# Patient Record
Sex: MALE | Race: BLACK OR AFRICAN AMERICAN | NOT HISPANIC OR LATINO | Employment: FULL TIME | URBAN - METROPOLITAN AREA
[De-identification: names, ages, dates, MRNs, and addresses within clinical notes are randomized per-mention and may not be internally consistent; named-entity substitution may affect disease eponyms.]

---

## 2023-01-24 PROBLEM — S43.431A LABRAL TEAR OF SHOULDER, RIGHT, INITIAL ENCOUNTER: Status: ACTIVE | Noted: 2023-01-24

## 2023-01-24 PROBLEM — M25.511 SHOULDER PAIN, RIGHT: Status: ACTIVE | Noted: 2023-01-24

## 2023-01-24 PROBLEM — M25.611 STIFFNESS OF RIGHT SHOULDER JOINT: Status: ACTIVE | Noted: 2023-01-24

## 2023-01-24 PROBLEM — M54.30 SCIATICA: Status: ACTIVE | Noted: 2023-01-24

## 2023-01-24 PROBLEM — R29.898 SHOULDER WEAKNESS: Status: ACTIVE | Noted: 2023-01-24

## 2023-01-24 RX ORDER — CYCLOBENZAPRINE HCL 10 MG
1 TABLET ORAL EVERY 8 HOURS PRN
COMMUNITY
Start: 2022-10-31 | End: 2023-03-09

## 2023-02-11 RX ORDER — OMEGA-3-ACID ETHYL ESTERS 1 G/1
2 CAPSULE, LIQUID FILLED ORAL 2 TIMES DAILY
COMMUNITY
Start: 2022-03-25 | End: 2023-03-09 | Stop reason: SDUPTHER

## 2023-03-09 ENCOUNTER — OFFICE VISIT (OUTPATIENT)
Dept: PRIMARY CARE | Facility: CLINIC | Age: 33
End: 2023-03-09
Payer: OTHER GOVERNMENT

## 2023-03-09 VITALS
WEIGHT: 237 LBS | HEIGHT: 76 IN | SYSTOLIC BLOOD PRESSURE: 106 MMHG | OXYGEN SATURATION: 98 % | HEART RATE: 69 BPM | DIASTOLIC BLOOD PRESSURE: 76 MMHG | BODY MASS INDEX: 28.86 KG/M2 | TEMPERATURE: 98 F

## 2023-03-09 DIAGNOSIS — E78.1 HYPERTRIGLYCERIDEMIA: Primary | ICD-10-CM

## 2023-03-09 PROBLEM — M54.30 SCIATICA: Status: RESOLVED | Noted: 2023-01-24 | Resolved: 2023-03-09

## 2023-03-09 PROBLEM — M25.511 SHOULDER PAIN, RIGHT: Status: RESOLVED | Noted: 2023-01-24 | Resolved: 2023-03-09

## 2023-03-09 PROBLEM — S43.431A LABRAL TEAR OF SHOULDER, RIGHT, INITIAL ENCOUNTER: Status: RESOLVED | Noted: 2023-01-24 | Resolved: 2023-03-09

## 2023-03-09 PROBLEM — R29.898 SHOULDER WEAKNESS: Status: RESOLVED | Noted: 2023-01-24 | Resolved: 2023-03-09

## 2023-03-09 PROBLEM — M25.611 STIFFNESS OF RIGHT SHOULDER JOINT: Status: RESOLVED | Noted: 2023-01-24 | Resolved: 2023-03-09

## 2023-03-09 PROCEDURE — 1036F TOBACCO NON-USER: CPT | Performed by: FAMILY MEDICINE

## 2023-03-09 PROCEDURE — 99214 OFFICE O/P EST MOD 30 MIN: CPT | Performed by: FAMILY MEDICINE

## 2023-03-09 RX ORDER — OMEGA-3-ACID ETHYL ESTERS 1 G/1
1 CAPSULE, LIQUID FILLED ORAL 2 TIMES DAILY
Qty: 180 CAPSULE | Refills: 3 | Status: SHIPPED | OUTPATIENT
Start: 2023-03-09 | End: 2023-09-19 | Stop reason: SDUPTHER

## 2023-03-09 ASSESSMENT — PATIENT HEALTH QUESTIONNAIRE - PHQ9
SUM OF ALL RESPONSES TO PHQ9 QUESTIONS 1 AND 2: 0
2. FEELING DOWN, DEPRESSED OR HOPELESS: NOT AT ALL
1. LITTLE INTEREST OR PLEASURE IN DOING THINGS: NOT AT ALL

## 2023-03-09 NOTE — PROGRESS NOTES
ASSESSMENT/PLAN:      Problem List Items Addressed This Visit          Other    Hypertriglyceridemia - Primary    Relevant Medications    omega-3 acid ethyl esters (Lovaza) 1 gram capsule    Other Relevant Orders    Lipid panel       Patient Instructions:  Patient Instructions   Glad to see that everything is going well.  Continue Lovaza.  We will recheck labs.  Follow-up in 6 months      Signed by: Yisel Penny DO       FUTURE DIRECTION:   Need to do CPE at next appointment    Subjective   SUBJECTIVE:     HPI : Patient is a 33 y.o. male who presents today for the following:     Elevated Cholesterol and Triglycerides: Was treated with simvastatin but was advised to discontinue and try Lovaza      Chest Pain: improved  Has strong family history of CAD, father passed at 49 years old from MI    Anxiety: has a lot of anxiety at work trying to meet expectations, but ia able to cope     Review of Systems   All other systems reviewed and are negative.      Past Medical History:   Diagnosis Date    Hypertriglyceridemia 03/09/2023        No past surgical history on file.     Current Outpatient Medications   Medication Instructions    omega-3 acid ethyl esters (LOVAZA) 1 g, oral, 2 times daily, For high triglycerides        Allergies   Allergen Reactions    Penicillins Hives and Swelling        Social History     Socioeconomic History    Marital status:      Spouse name: Not on file    Number of children: Not on file    Years of education: Not on file    Highest education level: Not on file   Occupational History    Not on file   Tobacco Use    Smoking status: Never    Smokeless tobacco: Never   Vaping Use    Vaping status: Not on file   Substance and Sexual Activity    Alcohol use: Not on file    Drug use: Not on file    Sexual activity: Not on file   Other Topics Concern    Not on file   Social History Narrative    Not on file     Social Determinants of Health     Financial Resource Strain: Not on file  "  Food Insecurity: Not on file   Transportation Needs: Not on file   Physical Activity: Not on file   Stress: Not on file   Social Connections: Not on file   Intimate Partner Violence: Not on file   Housing Stability: Not on file        Family History   Problem Relation Name Age of Onset    Hypertension Mother      Heart disease Father      Multiple sclerosis Sister          Objective   OBJECTIVE:     Vitals:    03/09/23 0735   BP: 106/76   Pulse: 69   Temp: 36.7 °C (98 °F)   TempSrc: Temporal   SpO2: 98%   Weight: 108 kg (237 lb)   Height: 1.918 m (6' 3.5\")        Physical Exam  HENT:      Head: Normocephalic and atraumatic.      Nose: Nose normal.      Mouth/Throat:      Mouth: Mucous membranes are moist.   Eyes:      Pupils: Pupils are equal, round, and reactive to light.   Cardiovascular:      Rate and Rhythm: Normal rate and regular rhythm.      Pulses: Normal pulses.      Heart sounds: No murmur heard.  Pulmonary:      Effort: Pulmonary effort is normal.      Breath sounds: Normal breath sounds.   Abdominal:      Tenderness: There is no abdominal tenderness.   Musculoskeletal:         General: Normal range of motion.      Cervical back: Normal range of motion.   Skin:     General: Skin is warm and dry.   Neurological:      Mental Status: He is alert.   Psychiatric:         Mood and Affect: Mood normal.           "

## 2023-03-09 NOTE — PATIENT INSTRUCTIONS
Glad to see that everything is going well.  Continue Lovaza.  We will recheck labs.  Follow-up in 6 months

## 2023-03-10 ENCOUNTER — TELEPHONE (OUTPATIENT)
Dept: PRIMARY CARE | Facility: CLINIC | Age: 33
End: 2023-03-10
Payer: OTHER GOVERNMENT

## 2023-09-18 ASSESSMENT — PROMIS GLOBAL HEALTH SCALE
RATE_SOCIAL_SATISFACTION: VERY GOOD
RATE_AVERAGE_FATIGUE: MODERATE
CARRYOUT_SOCIAL_ACTIVITIES: VERY GOOD
CARRYOUT_PHYSICAL_ACTIVITIES: MODERATELY
RATE_QUALITY_OF_LIFE: GOOD
RATE_PHYSICAL_HEALTH: GOOD
RATE_AVERAGE_PAIN: 7
EMOTIONAL_PROBLEMS: SOMETIMES
RATE_MENTAL_HEALTH: VERY GOOD
RATE_GENERAL_HEALTH: GOOD

## 2023-09-19 ENCOUNTER — OFFICE VISIT (OUTPATIENT)
Dept: PRIMARY CARE | Facility: CLINIC | Age: 33
End: 2023-09-19
Payer: OTHER GOVERNMENT

## 2023-09-19 VITALS
WEIGHT: 237 LBS | BODY MASS INDEX: 29.23 KG/M2 | SYSTOLIC BLOOD PRESSURE: 86 MMHG | HEART RATE: 69 BPM | OXYGEN SATURATION: 98 % | DIASTOLIC BLOOD PRESSURE: 64 MMHG | TEMPERATURE: 97.8 F

## 2023-09-19 DIAGNOSIS — M54.42 ACUTE LEFT-SIDED LOW BACK PAIN WITH LEFT-SIDED SCIATICA: ICD-10-CM

## 2023-09-19 DIAGNOSIS — E78.1 HYPERTRIGLYCERIDEMIA: Primary | ICD-10-CM

## 2023-09-19 PROCEDURE — 99395 PREV VISIT EST AGE 18-39: CPT | Performed by: FAMILY MEDICINE

## 2023-09-19 PROCEDURE — 1036F TOBACCO NON-USER: CPT | Performed by: FAMILY MEDICINE

## 2023-09-19 RX ORDER — NAPROXEN 500 MG/1
500 TABLET ORAL 2 TIMES DAILY PRN
Qty: 42 TABLET | Refills: 0 | Status: SHIPPED | OUTPATIENT
Start: 2023-09-19 | End: 2023-10-10

## 2023-09-19 RX ORDER — OMEGA-3-ACID ETHYL ESTERS 1 G/1
1 CAPSULE, LIQUID FILLED ORAL 2 TIMES DAILY
Qty: 180 CAPSULE | Refills: 3 | Status: SHIPPED | OUTPATIENT
Start: 2023-09-19 | End: 2024-09-18

## 2023-09-19 NOTE — PROGRESS NOTES
ASSESSMENT/PLAN:      Problem List Items Addressed This Visit       Hypertriglyceridemia - Primary    Relevant Medications    omega-3 acid ethyl esters (Lovaza) 1 gram capsule     Other Visit Diagnoses       Acute left-sided low back pain with left-sided sciatica        Relevant Medications    naproxen (Naprosyn) 500 mg tablet    Other Relevant Orders    Referral to Physical Therapy          Patient Instructions:  Patient Instructions   Hypertriglycerides: Make sure to get labs done  Acute low back pain: Try naproxen twice a day for 2 to 3 weeks, referral for physical therapy      Signed by: Yisel Penny DO       FUTURE DIRECTION:   Can call for xray if no improvement     Subjective   SUBJECTIVE:     HPI : Patient is a 33 y.o. male who presents today for the following:     Elevated Cholesterol and Triglycerides:   - Was treated with simvastatin but was advised to discontinue and try Lovaza    Left low back pain  - pain radiates to left glutes and around hip   - worse with running and cardio   - has tried ibuprofen and tylenol       Anxiety: has a lot of anxiety at work trying to meet expectations, but is able to cope     Review of Systems   All other systems reviewed and are negative.      Past Medical History:   Diagnosis Date    Hypertriglyceridemia 03/09/2023        History reviewed. No pertinent surgical history.     Current Outpatient Medications   Medication Instructions    naproxen (NAPROSYN) 500 mg, oral, 2 times daily PRN    omega-3 acid ethyl esters (LOVAZA) 1 g, oral, 2 times daily, For high triglycerides        Allergies   Allergen Reactions    Penicillins Hives and Swelling        Social History     Socioeconomic History    Marital status:      Spouse name: Not on file    Number of children: Not on file    Years of education: Not on file    Highest education level: Not on file   Occupational History    Not on file   Tobacco Use    Smoking status: Never    Smokeless tobacco: Never    Substance and Sexual Activity    Alcohol use: Not on file    Drug use: Not on file    Sexual activity: Not on file   Other Topics Concern    Not on file   Social History Narrative    Not on file     Social Determinants of Health     Financial Resource Strain: Not on file   Food Insecurity: Not on file   Transportation Needs: Not on file   Physical Activity: Not on file   Stress: Not on file   Social Connections: Not on file   Intimate Partner Violence: Not on file   Housing Stability: Not on file        Family History   Problem Relation Name Age of Onset    Hypertension Mother      Heart disease Father      Multiple sclerosis Sister          Objective   OBJECTIVE:     Vitals:    09/19/23 0821   BP: 86/64   Pulse: 69   Temp: 36.6 °C (97.8 °F)   SpO2: 98%   Weight: 108 kg (237 lb)        Physical Exam  HENT:      Head: Normocephalic and atraumatic.      Nose: Nose normal.      Mouth/Throat:      Mouth: Mucous membranes are moist.   Eyes:      Pupils: Pupils are equal, round, and reactive to light.   Cardiovascular:      Rate and Rhythm: Normal rate and regular rhythm.      Pulses: Normal pulses.      Heart sounds: No murmur heard.  Pulmonary:      Effort: Pulmonary effort is normal.      Breath sounds: Normal breath sounds.   Abdominal:      Tenderness: There is no abdominal tenderness.   Musculoskeletal:         General: Normal range of motion.      Cervical back: Normal range of motion.      Comments: +left FADIR   Skin:     General: Skin is warm and dry.   Neurological:      Mental Status: He is alert.   Psychiatric:         Mood and Affect: Mood normal.

## 2023-09-19 NOTE — PATIENT INSTRUCTIONS
Hypertriglycerides: Make sure to get labs done  Acute low back pain: Try naproxen twice a day for 2 to 3 weeks, referral for physical therapy

## 2023-09-27 DIAGNOSIS — Z23 FLU VACCINE NEED: ICD-10-CM

## 2023-09-29 ENCOUNTER — CLINICAL SUPPORT (OUTPATIENT)
Dept: PRIMARY CARE | Facility: CLINIC | Age: 33
End: 2023-09-29
Payer: OTHER GOVERNMENT

## 2023-09-29 DIAGNOSIS — Z23 FLU VACCINE NEED: ICD-10-CM

## 2023-09-29 PROCEDURE — 90686 IIV4 VACC NO PRSV 0.5 ML IM: CPT | Performed by: FAMILY MEDICINE

## 2023-09-29 PROCEDURE — 90471 IMMUNIZATION ADMIN: CPT | Performed by: FAMILY MEDICINE

## 2023-11-21 ENCOUNTER — LAB (OUTPATIENT)
Dept: LAB | Facility: LAB | Age: 33
End: 2023-11-21
Payer: OTHER GOVERNMENT

## 2023-11-21 ENCOUNTER — TELEPHONE (OUTPATIENT)
Dept: PRIMARY CARE | Facility: CLINIC | Age: 33
End: 2023-11-21

## 2023-11-21 DIAGNOSIS — E78.1 HYPERTRIGLYCERIDEMIA: ICD-10-CM

## 2023-11-21 LAB
CHOLEST SERPL-MCNC: 152 MG/DL (ref 0–199)
CHOLESTEROL/HDL RATIO: 4.9
HDLC SERPL-MCNC: 31.3 MG/DL
LDLC SERPL CALC-MCNC: 62 MG/DL
NON HDL CHOLESTEROL: 121 MG/DL (ref 0–149)
TRIGL SERPL-MCNC: 294 MG/DL (ref 0–149)
VLDL: 59 MG/DL (ref 0–40)

## 2023-11-21 PROCEDURE — 80061 LIPID PANEL: CPT

## 2023-11-21 PROCEDURE — 36415 COLL VENOUS BLD VENIPUNCTURE: CPT

## 2023-11-21 NOTE — TELEPHONE ENCOUNTER
----- Message from Yisel Penny DO sent at 11/21/2023  1:27 PM EST -----  Please let patient know the following:  HDL still less than 40, this usually improves with exercise recommended 30 minutes of aerobic exercise daily  Triglycerides and VLDL  improved

## 2024-01-20 ENCOUNTER — HOSPITAL ENCOUNTER (OUTPATIENT)
Dept: RADIOLOGY | Facility: EXTERNAL LOCATION | Age: 34
Discharge: HOME | End: 2024-01-20

## 2024-01-20 DIAGNOSIS — M54.9 UPPER BACK PAIN: ICD-10-CM

## 2024-09-19 ENCOUNTER — APPOINTMENT (OUTPATIENT)
Dept: PRIMARY CARE | Facility: CLINIC | Age: 34
End: 2024-09-19
Payer: OTHER GOVERNMENT

## 2025-01-04 ENCOUNTER — OFFICE VISIT (OUTPATIENT)
Dept: URGENT CARE | Age: 35
End: 2025-01-04
Payer: OTHER GOVERNMENT

## 2025-01-04 VITALS
SYSTOLIC BLOOD PRESSURE: 150 MMHG | DIASTOLIC BLOOD PRESSURE: 72 MMHG | HEART RATE: 99 BPM | RESPIRATION RATE: 20 BRPM | OXYGEN SATURATION: 96 % | TEMPERATURE: 98.1 F

## 2025-01-04 DIAGNOSIS — J10.1 INFLUENZA A: Primary | ICD-10-CM

## 2025-01-04 DIAGNOSIS — R05.1 ACUTE COUGH: ICD-10-CM

## 2025-01-04 LAB
POC RAPID INFLUENZA A: POSITIVE
POC RAPID INFLUENZA B: NEGATIVE
POC SARS-COV-2 AG BINAX: NORMAL

## 2025-01-04 RX ORDER — OSELTAMIVIR PHOSPHATE 75 MG/1
75 CAPSULE ORAL EVERY 12 HOURS
Qty: 10 CAPSULE | Refills: 0 | Status: SHIPPED | OUTPATIENT
Start: 2025-01-04 | End: 2025-01-09

## 2025-01-04 ASSESSMENT — ENCOUNTER SYMPTOMS: COUGH: 1

## 2025-01-04 NOTE — LETTER
January 4, 2025     Patient: Christopher Pepe   YOB: 1990   Date of Visit: 1/4/2025       To Whom It May Concern:    Christopher Pepe was seen in my clinic on 1/4/2025 at 9:45 am. Please excuse Christopher for his absence from work on this day to make the appointment.  Patient tested positive for influenza A.     If you have any questions or concerns, please don't hesitate to call.         Sincerely,         Susannah Bennett PA-C

## 2025-01-04 NOTE — PROGRESS NOTES
Subjective   Patient ID: Christopher Pepe is a 34 y.o. male. They present today with a chief complaint of Cough and Nasal Congestion.    History of Present Illness  Patient presents for illness symptoms that started a few days ago.  Patient admits to nasal congestion, runny nose, postnasal drainage, cough. He states he has scratchy throat and ear pressure. He denies fevers, chills, SOB, chest tightness, wheezing. He had childhood asthma, though hasn't needed albuterol in many years.        Cough        Past Medical History  Allergies as of 01/04/2025 - Reviewed 01/04/2025   Allergen Reaction Noted    Penicillins Hives and Swelling 01/24/2023       (Not in a hospital admission)       Past Medical History:   Diagnosis Date    Hypertriglyceridemia 03/09/2023       No past surgical history on file.     reports that he has never smoked. He has never used smokeless tobacco.    Review of Systems  Review of Systems   Respiratory:  Positive for cough.                                   Objective    Vitals:    01/04/25 0955   BP: 150/72   Pulse: 99   Resp: 20   Temp: 36.7 °C (98.1 °F)   SpO2: 96%     No LMP for male patient.    Physical Exam    Procedures    Point of Care Test & Imaging Results from this visit  Results for orders placed or performed in visit on 01/04/25   POCT Covid-19 Rapid Antigen   Result Value Ref Range    POC BEAU-COV-2 AG  Presumptive negative test for SARS-CoV-2 (no antigen detected)     Presumptive negative test for SARS-CoV-2 (no antigen detected)   POCT Influenza A/B manually resulted   Result Value Ref Range    POC Rapid Influenza A Positive (A) Negative    POC Rapid Influenza B Negative Negative      No results found.    Diagnostic study results (if any) were reviewed by Susannah Bennett PA-C.    Assessment/Plan   Allergies, medications, history, and pertinent labs/EKGs/Imaging reviewed by Susannah Bennett PA-C.     Medical Decision Making  MDM: History, examination and testing are consistent with  influenza.  There are no signs of pneumonia, sinusitis, otitis or other bacterial etiology. Rx for Tamiflu.  Plan at this time is supportive measures and symptomatic care at home. Advised to go to ER if worsens, otherwise follow with pcp. Patient verbalized understanding and agrees with plan.      Orders and Diagnoses  Diagnoses and all orders for this visit:  Influenza A  -     oseltamivir (Tamiflu) 75 mg capsule; Take 1 capsule (75 mg) by mouth every 12 hours for 5 days.  Acute cough  -     POCT Covid-19 Rapid Antigen  -     POCT Influenza A/B manually resulted      Medical Admin Record      Patient disposition: Home    Electronically signed by Susannah Bennett PA-C  10:09 AM

## 2025-02-04 ENCOUNTER — APPOINTMENT (OUTPATIENT)
Dept: PRIMARY CARE | Facility: CLINIC | Age: 35
End: 2025-02-04
Payer: OTHER GOVERNMENT

## 2025-02-04 VITALS
SYSTOLIC BLOOD PRESSURE: 118 MMHG | DIASTOLIC BLOOD PRESSURE: 78 MMHG | BODY MASS INDEX: 30.21 KG/M2 | HEART RATE: 76 BPM | OXYGEN SATURATION: 98 % | TEMPERATURE: 97.7 F | WEIGHT: 243 LBS | HEIGHT: 75 IN

## 2025-02-04 DIAGNOSIS — E78.1 HYPERTRIGLYCERIDEMIA: ICD-10-CM

## 2025-02-04 DIAGNOSIS — R79.89 ABNORMAL TSH: ICD-10-CM

## 2025-02-04 DIAGNOSIS — Z13.1 SCREENING FOR DIABETES MELLITUS: Primary | ICD-10-CM

## 2025-02-04 DIAGNOSIS — E78.1 HIGH TRIGLYCERIDES: ICD-10-CM

## 2025-02-04 DIAGNOSIS — Z13.0 SCREENING, ANEMIA, DEFICIENCY, IRON: ICD-10-CM

## 2025-02-04 DIAGNOSIS — Z82.49 FAMILY HISTORY OF EARLY CAD: ICD-10-CM

## 2025-02-04 DIAGNOSIS — Z13.220 LIPID SCREENING: ICD-10-CM

## 2025-02-04 PROCEDURE — 3008F BODY MASS INDEX DOCD: CPT | Performed by: FAMILY MEDICINE

## 2025-02-04 PROCEDURE — 1036F TOBACCO NON-USER: CPT | Performed by: FAMILY MEDICINE

## 2025-02-04 PROCEDURE — 99395 PREV VISIT EST AGE 18-39: CPT | Performed by: FAMILY MEDICINE

## 2025-02-04 RX ORDER — OMEGA-3-ACID ETHYL ESTERS 1 G/1
1 CAPSULE, LIQUID FILLED ORAL 2 TIMES DAILY
Qty: 180 CAPSULE | Refills: 3 | Status: SHIPPED | OUTPATIENT
Start: 2025-02-04 | End: 2026-02-04

## 2025-02-04 RX ORDER — CYCLOBENZAPRINE HCL 10 MG
TABLET ORAL
COMMUNITY
End: 2025-02-04 | Stop reason: ALTCHOICE

## 2025-02-04 RX ORDER — AZITHROMYCIN 250 MG/1
TABLET, FILM COATED ORAL
COMMUNITY
Start: 2023-06-07 | End: 2025-02-04 | Stop reason: ALTCHOICE

## 2025-02-04 RX ORDER — NEOMYCIN SULFATE, POLYMYXIN B SULFATE AND HYDROCORTISONE 10; 3.5; 1 MG/ML; MG/ML; [USP'U]/ML
SUSPENSION/ DROPS AURICULAR (OTIC)
COMMUNITY
Start: 2023-06-07 | End: 2025-02-04 | Stop reason: ALTCHOICE

## 2025-02-04 ASSESSMENT — ENCOUNTER SYMPTOMS
CHILLS: 0
SHORTNESS OF BREATH: 0
APPETITE CHANGE: 0
FEVER: 0
ABDOMINAL PAIN: 0
ACTIVITY CHANGE: 0
LIGHT-HEADEDNESS: 0
DIZZINESS: 0
FATIGUE: 0

## 2025-02-04 ASSESSMENT — PATIENT HEALTH QUESTIONNAIRE - PHQ9
SUM OF ALL RESPONSES TO PHQ9 QUESTIONS 1 AND 2: 0
1. LITTLE INTEREST OR PLEASURE IN DOING THINGS: NOT AT ALL
2. FEELING DOWN, DEPRESSED OR HOPELESS: NOT AT ALL

## 2025-02-04 NOTE — PATIENT INSTRUCTIONS
I would recommend a calcium score to further evaluate cardiac risk. This is a CT scan of the arteries of the heart to determine if there is any plaque formation. It can help us determine if cholesterol medication is indicated.

## 2025-02-04 NOTE — ASSESSMENT & PLAN NOTE
Orders:    omega-3 acid ethyl esters (Lovaza) 1 gram capsule; Take 1 capsule (1 g) by mouth 2 times a day. For high triglycerides    CBC and Auto Differential; Future    Comprehensive Metabolic Panel; Future    Lipid Panel; Future     SPOKE TO PATIENT TO INFORM HER OF UGI SCHEDULE.  SCHEDULE AS FOLLOW:    UGI: THURS: June 30, 2022 @ 8:30 AM  [facility] ON [place], NOTHING TO EAT OR DRINK 6 HOURS PRIOR.    PATIENT VERBALIZED UNDERSTANDING.    INSTRUCTED PATIENT THAT FOLLOW UP APPOINTMENT WITH ANTON[facility] AGUERO HAS BEEN RESCHEDULED FOR AFTER THIS UGI IS COMPLETE.    FOLLOW UP APPOINTMENT WITH [facility] IS SCHEDULED FOR TUES July 5, 2022 @ 1:30 PM.    PATIENT VERBALIZED UNDERSTANDING OF THIS CHANGE IN FOLLOW UP APPOINTMENT.    REMINDER LETTER WITH THESE APPOINTMENTS ARE PLACED IN OUT GOING MAIL.

## 2025-02-04 NOTE — PROGRESS NOTES
"Subjective   Patient ID: Christopher Pepe is a 35 y.o. male who presents for Annual Exam (CPE).    HPI   The patient presents for his annual wellness exam.   Hx of colonoscopy: no FH history   Hx of prostate cancer screening (men 55-69): NA   Lung cancer screening: does not smoke   HIV and Hep C Screening: NA   STD screening: NA   Immunizations: already got the flu shot   History of depression or anxiety: none   Diet: Follows a healthy diet  Exercise: Gets regular exercise      Review of Systems   Constitutional:  Negative for activity change, appetite change, chills, fatigue and fever.   Respiratory:  Negative for shortness of breath.    Cardiovascular:  Negative for chest pain.   Gastrointestinal:  Negative for abdominal pain.   Skin:  Negative for rash.   Neurological:  Negative for dizziness and light-headedness.       Objective   /78   Pulse 76   Temp 36.5 °C (97.7 °F)   Ht 1.905 m (6' 3\")   Wt 110 kg (243 lb)   SpO2 98%   BMI 30.37 kg/m²     Physical Exam  Vitals reviewed.   Constitutional:       Appearance: Normal appearance. He is normal weight.   Eyes:      Pupils: Pupils are equal, round, and reactive to light.   Cardiovascular:      Rate and Rhythm: Normal rate and regular rhythm.   Pulmonary:      Effort: Pulmonary effort is normal.      Breath sounds: Normal breath sounds.   Abdominal:      General: Abdomen is flat. Bowel sounds are normal.      Palpations: Abdomen is soft.   Skin:     General: Skin is warm.   Neurological:      Mental Status: He is alert and oriented to person, place, and time. Mental status is at baseline.   Psychiatric:         Mood and Affect: Mood normal.       Assessment/Plan   Assessment & Plan  Hypertriglyceridemia    Orders:    omega-3 acid ethyl esters (Lovaza) 1 gram capsule; Take 1 capsule (1 g) by mouth 2 times a day. For high triglycerides    CBC and Auto Differential; Future    Comprehensive Metabolic Panel; Future    Lipid Panel; Future    Screening for " diabetes mellitus    Orders:    Comprehensive Metabolic Panel; Future    Comprehensive Metabolic Panel; Future    Screening, anemia, deficiency, iron    Orders:    CBC and Auto Differential; Future    CBC and Auto Differential; Future    Lipid screening    Orders:    Lipid Panel; Future    Lipid Panel; Future    Abnormal TSH    Orders:    TSH with reflex to Free T4 if abnormal; Future    TSH with reflex to Free T4 if abnormal; Future    High triglycerides    Orders:    CT cardiac scoring wo IV contrast; Future    Family history of early CAD    Orders:    CT cardiac scoring wo IV contrast; Future

## 2025-02-19 LAB
ALBUMIN SERPL-MCNC: 5 G/DL (ref 3.6–5.1)
ALP SERPL-CCNC: 48 U/L (ref 36–130)
ALT SERPL-CCNC: 26 U/L (ref 9–46)
ANION GAP SERPL CALCULATED.4IONS-SCNC: 9 MMOL/L (CALC) (ref 7–17)
AST SERPL-CCNC: 29 U/L (ref 10–40)
BASOPHILS # BLD AUTO: 37 CELLS/UL (ref 0–200)
BASOPHILS NFR BLD AUTO: 0.5 %
BILIRUB SERPL-MCNC: 0.6 MG/DL (ref 0.2–1.2)
BUN SERPL-MCNC: 12 MG/DL (ref 7–25)
CALCIUM SERPL-MCNC: 9.7 MG/DL (ref 8.6–10.3)
CHLORIDE SERPL-SCNC: 102 MMOL/L (ref 98–110)
CHOLEST SERPL-MCNC: 174 MG/DL
CHOLEST/HDLC SERPL: 5.3 (CALC)
CO2 SERPL-SCNC: 29 MMOL/L (ref 20–32)
CREAT SERPL-MCNC: 1.21 MG/DL (ref 0.6–1.26)
EGFRCR SERPLBLD CKD-EPI 2021: 81 ML/MIN/1.73M2
EOSINOPHIL # BLD AUTO: 481 CELLS/UL (ref 15–500)
EOSINOPHIL NFR BLD AUTO: 6.5 %
ERYTHROCYTE [DISTWIDTH] IN BLOOD BY AUTOMATED COUNT: 11.6 % (ref 11–15)
GLUCOSE SERPL-MCNC: 119 MG/DL (ref 65–99)
HBA1C MFR BLD: 5 % OF TOTAL HGB
HCT VFR BLD AUTO: 42.3 % (ref 38.5–50)
HDLC SERPL-MCNC: 33 MG/DL
HGB BLD-MCNC: 14.3 G/DL (ref 13.2–17.1)
LDLC SERPL CALC-MCNC: ABNORMAL MG/DL
LYMPHOCYTES # BLD AUTO: 1902 CELLS/UL (ref 850–3900)
LYMPHOCYTES NFR BLD AUTO: 25.7 %
MCH RBC QN AUTO: 31 PG (ref 27–33)
MCHC RBC AUTO-ENTMCNC: 33.8 G/DL (ref 32–36)
MCV RBC AUTO: 91.8 FL (ref 80–100)
MONOCYTES # BLD AUTO: 400 CELLS/UL (ref 200–950)
MONOCYTES NFR BLD AUTO: 5.4 %
NEUTROPHILS # BLD AUTO: 4581 CELLS/UL (ref 1500–7800)
NEUTROPHILS NFR BLD AUTO: 61.9 %
NONHDLC SERPL-MCNC: 141 MG/DL (CALC)
PLATELET # BLD AUTO: 210 THOUSAND/UL (ref 140–400)
PMV BLD REES-ECKER: 13.7 FL (ref 7.5–12.5)
POTASSIUM SERPL-SCNC: 4.1 MMOL/L (ref 3.5–5.3)
PROT SERPL-MCNC: 7.4 G/DL (ref 6.1–8.1)
RBC # BLD AUTO: 4.61 MILLION/UL (ref 4.2–5.8)
SODIUM SERPL-SCNC: 140 MMOL/L (ref 135–146)
TRIGL SERPL-MCNC: 566 MG/DL
TSH SERPL-ACNC: 2.3 MIU/L (ref 0.4–4.5)
WBC # BLD AUTO: 7.4 THOUSAND/UL (ref 3.8–10.8)

## 2025-06-14 ENCOUNTER — OFFICE VISIT (OUTPATIENT)
Dept: URGENT CARE | Age: 35
End: 2025-06-14
Payer: OTHER GOVERNMENT

## 2025-06-14 VITALS
DIASTOLIC BLOOD PRESSURE: 74 MMHG | OXYGEN SATURATION: 97 % | RESPIRATION RATE: 16 BRPM | HEART RATE: 80 BPM | SYSTOLIC BLOOD PRESSURE: 115 MMHG | TEMPERATURE: 98.2 F

## 2025-06-14 DIAGNOSIS — M54.41 ACUTE RIGHT-SIDED LOW BACK PAIN WITH RIGHT-SIDED SCIATICA: Primary | ICD-10-CM

## 2025-06-14 PROBLEM — M54.30 SCIATICA: Status: ACTIVE | Noted: 2022-10-31

## 2025-06-14 PROBLEM — M54.50 LOW BACK PAIN, UNSPECIFIED: Status: ACTIVE | Noted: 2022-11-18

## 2025-06-14 RX ORDER — METHYLPREDNISOLONE 4 MG/1
TABLET ORAL
Qty: 21 TABLET | Refills: 0 | Status: SHIPPED | OUTPATIENT
Start: 2025-06-14

## 2025-06-14 RX ORDER — CYCLOBENZAPRINE HCL 10 MG
10 TABLET ORAL 3 TIMES DAILY PRN
Qty: 20 TABLET | Refills: 0 | Status: SHIPPED | OUTPATIENT
Start: 2025-06-14 | End: 2025-06-24

## 2025-06-14 ASSESSMENT — ENCOUNTER SYMPTOMS
PSYCHIATRIC NEGATIVE: 1
GASTROINTESTINAL NEGATIVE: 1
BACK PAIN: 1
CONSTITUTIONAL NEGATIVE: 1
NECK PAIN: 0
RESPIRATORY NEGATIVE: 1
CARDIOVASCULAR NEGATIVE: 1
MYALGIAS: 1
NEUROLOGICAL NEGATIVE: 1
NECK STIFFNESS: 0

## 2025-06-14 NOTE — PATIENT INSTRUCTIONS
Primary Diagnosis: Mechanical low back pain (e.g., muscle strain, ligament sprain). Other Considerations: Herniated disc/radiculopathy, spinal stenosis, vertebral compression fracture, infection (e.g., vertebral osteomyelitis, discitis), malignancy (e.g., metastatic disease), inflammatory arthritis (e.g., ankylosing spondylitis).  Supporting Information for Differential Diagnosis: Findings Suggestive of Primary Diagnosis: lumbar strain Pain localized to the lower back without radiation, no neurological deficits (e.g., no weakness, numbness, or tingling). Pain worsened with movement and relieved by rest. Findings to Rule Out Red Flags: No history of trauma, fever, or recent infection. No bowel or bladder dysfunction. No history of cancer, weight loss, or night pain. Normal neurological exam, including strength, reflexes, and sensation.  Red Flags Discussed with the Patient: New or worsening neurological symptoms (e.g., leg weakness, numbness, tingling). Loss of bowel or bladder control (concern for cauda equina syndrome). Fever, chills, or unexplained weight loss (suggestive of infection or malignancy). Severe pain unresponsive to medications or progressively worsening. History of trauma or osteoporosis (risk for fractures). Patient advised to seek immediate care if any of the above symptoms develop.  Plan:  Diagnostic Workup (if indicated): No imaging recommended at this time for acute, uncomplicated back pain (as per guidelines). Consider imaging (e.g., X-ray, MRI) if red flags present or if symptoms persist >4-6 weeks.   Treatment Provided in Clinic: pain management:  Patient given prescriptions for Medrol and cyclobenzaprine (should use caution-may make you drowsy).  Ice/heat may be helpful, gentle stretches, biofreeze/icy hot or equivalent may be helpful. Acetaminophen, use caution when taking steroids-taking with NSAIDs can increase risk of GI ulceration, bleeding, Hypertension, sodium/water retention, edema.   Don't start NSAIDS until steroid completed.  Heat or ice application instructions, Icy hot or eqivalent, massage may be helpful, gentle stretches, physical activity as tolerated; avoid prolonged bed rest.  Patient Education: reassurance regarding the benign nature of most back pain, emphasize maintaining mobility and core strengthening. Follow-up if symptoms persist or worsen.  Disposition:  Discharged with instructions to monitor for red flags.  Follow-up with primary care or physical therapy if no improvement in [timeframe, e.g., 2-4 weeks].

## 2025-06-14 NOTE — PROGRESS NOTES
"Subjective   Patient ID: Thom Pepe is a 35 y.o. male. They present today with a chief complaint of lower back pain (Physical fitness test /hx sciatica).    History of Present Illness  Reports that 6/9/25 was participating in [facility] fitness test.  Was doing a dead lift when he began feeling muscle pulling/pain right lower back.  No loss of bowel or bladder, no leg weakness.  Has been taking motrin, sl helpful, standing helps.  Moving, lifting legs, bending makes it worse.  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            Past Medical History  Allergies as of 06/14/2025 - Reviewed 02/04/2025   Allergen Reaction Noted    Penicillins Hives and Swelling 01/24/2023       Prescriptions Prior to Admission[1]     Medical History[2]    Surgical History[3]     reports that he has never smoked. He has never used smokeless tobacco.    Review of Systems  Review of Systems   Constitutional: Negative.    Respiratory: Negative.     Cardiovascular: Negative.    Gastrointestinal: Negative.    Genitourinary: Negative.    Musculoskeletal:  Positive for back pain and myalgias. Negative for gait problem, neck pain and neck stiffness.   Neurological: Negative.    Psychiatric/Behavioral: Negative.                                    Objective    Vitals:    06/14/25 0932   BP: 115/74   Pulse: 80   Resp: 16   Temp: 36.8 °C (98.2 °F)   SpO2: 97%     No LMP for male patient.    Physical Exam  Constitutional:       Appearance: Normal appearance.   Cardiovascular:      Rate and Rhythm: Normal rate and regular rhythm.      Pulses: Normal pulses.      Heart sounds: Normal heart sounds.   Pulmonary:      Effort: Pulmonary effort is normal.      Breath sounds: Normal breath sounds.   Musculoskeletal:      Cervical back: Normal.      Thoracic back: Normal.      Lumbar back: Spasms and tenderness present. No bony tenderness. Decreased range of motion. Negative right straight leg raise test " and negative left straight leg raise test.   Skin:     General: Skin is warm and dry.      Capillary Refill: Capillary refill takes less than 2 seconds.   Neurological:      General: No focal deficit present.      Mental Status: He is alert and oriented to person, place, and time.   Psychiatric:         Mood and Affect: Mood normal.         Behavior: Behavior normal.         Thought Content: Thought content normal.         Judgment: Judgment normal.         Procedures    Point of Care Test & Imaging Results from this visit  No results found for this visit on 06/14/25.   Imaging  No results found.    Cardiology, Vascular, and Other Imaging  No other imaging results found for the past 2 days      Diagnostic study results (if any) were reviewed by AUGIE Easley.    Assessment/Plan   Allergies, medications, history, and pertinent labs/EKGs/Imaging reviewed by AUGIE Easley.     Medical Decision Making  Primary Diagnosis: Mechanical low back pain (e.g., muscle strain, ligament sprain). Other Considerations: Herniated disc/radiculopathy, spinal stenosis, vertebral compression fracture, infection (e.g., vertebral osteomyelitis, discitis), malignancy (e.g., metastatic disease), inflammatory arthritis (e.g., ankylosing spondylitis).  Supporting Information for Differential Diagnosis: Findings Suggestive of Primary Diagnosis: lumbar strain Pain localized to the lower back without radiation, no neurological deficits (e.g., no weakness, numbness, or tingling). Pain worsened with movement and relieved by rest. Findings to Rule Out Red Flags: No history of trauma, fever, or recent infection. No bowel or bladder dysfunction. No history of cancer, weight loss, or night pain. Normal neurological exam, including strength, reflexes, and sensation.  Red Flags Discussed with the Patient: New or worsening neurological symptoms (e.g., leg weakness, numbness, tingling). Loss of bowel or bladder control (concern for  cauda equina syndrome). Fever, chills, or unexplained weight loss (suggestive of infection or malignancy). Severe pain unresponsive to medications or progressively worsening. History of trauma or osteoporosis (risk for fractures). Patient advised to seek immediate care if any of the above symptoms develop.  Plan:  Diagnostic Workup (if indicated): No imaging recommended at this time for acute, uncomplicated back pain (as per guidelines). Consider imaging (e.g., X-ray, MRI) if red flags present or if symptoms persist >4-6 weeks.   Treatment Provided in Clinic: pain management:  Patient given prescriptions for Medrol and cyclobenzaprine (should use caution-may make you drowsy).  Ice/heat may be helpful, gentle stretches, biofreeze/icy hot or equivalent may be helpful. Acetaminophen, use caution when taking steroids-taking with NSAIDs can increase risk of GI ulceration, bleeding, Hypertension, sodium/water retention, edema.  Don't start NSAIDS until steroid completed.  Heat or ice application instructions, Icy hot or eqivalent, massage may be helpful, gentle stretches, physical activity as tolerated; avoid prolonged bed rest.  Patient Education: reassurance regarding the benign nature of most back pain, emphasize maintaining mobility and core strengthening. Follow-up if symptoms persist or worsen.  Disposition:  Discharged with instructions to monitor for red flags.  Follow-up with primary care or physical therapy if no improvement in [timeframe, e.g., 2-4 weeks].    Orders and Diagnoses  Diagnoses and all orders for this visit:  Acute right-sided low back pain with right-sided sciatica  -     methylPREDNISolone (Medrol Dospak) 4 mg tablets; Take as directed on package.  -     cyclobenzaprine (Flexeril) 10 mg tablet; Take 1 tablet (10 mg) by mouth 3 times a day as needed for muscle spasms for up to 10 days. Use caution, may make you drowsy      Medical Admin Record      Patient disposition: Home    Electronically  signed by Julissa Lozano, APRN-CNP  9:59 AM           [1] (Not in a hospital admission)  [2]   Past Medical History:  Diagnosis Date    Hypertriglyceridemia 03/09/2023   [3] No past surgical history on file.

## 2025-07-05 ENCOUNTER — HOSPITAL ENCOUNTER (OUTPATIENT)
Dept: RADIOLOGY | Facility: HOSPITAL | Age: 35
Discharge: HOME | End: 2025-07-05
Payer: OTHER GOVERNMENT

## 2025-07-05 DIAGNOSIS — E78.1 HIGH TRIGLYCERIDES: ICD-10-CM

## 2025-07-05 DIAGNOSIS — Z82.49 FAMILY HISTORY OF EARLY CAD: ICD-10-CM

## 2025-07-05 PROCEDURE — 75571 CT HRT W/O DYE W/CA TEST: CPT

## 2025-07-08 ENCOUNTER — CONSULT (OUTPATIENT)
Dept: ENDOCRINOLOGY | Facility: CLINIC | Age: 35
End: 2025-07-08
Payer: OTHER GOVERNMENT

## 2025-07-08 VITALS
OXYGEN SATURATION: 100 % | BODY MASS INDEX: 30.55 KG/M2 | RESPIRATION RATE: 16 BRPM | TEMPERATURE: 97.7 F | HEART RATE: 74 BPM | DIASTOLIC BLOOD PRESSURE: 86 MMHG | WEIGHT: 245.7 LBS | HEIGHT: 75 IN | SYSTOLIC BLOOD PRESSURE: 117 MMHG

## 2025-07-08 DIAGNOSIS — Z31.89 ENCOUNTER FOR FERTILITY PLANNING: Primary | ICD-10-CM

## 2025-07-08 PROCEDURE — 99212 OFFICE O/P EST SF 10 MIN: CPT | Performed by: NURSE PRACTITIONER

## 2025-07-08 PROCEDURE — 99202 OFFICE O/P NEW SF 15 MIN: CPT | Performed by: NURSE PRACTITIONER

## 2025-07-08 ASSESSMENT — PATIENT HEALTH QUESTIONNAIRE - PHQ9
1. LITTLE INTEREST OR PLEASURE IN DOING THINGS: NOT AT ALL
2. FEELING DOWN, DEPRESSED OR HOPELESS: NOT AT ALL
SUM OF ALL RESPONSES TO PHQ9 QUESTIONS 1 AND 2: 0

## 2025-07-08 ASSESSMENT — COLUMBIA-SUICIDE SEVERITY RATING SCALE - C-SSRS
2. HAVE YOU ACTUALLY HAD ANY THOUGHTS OF KILLING YOURSELF?: NO
1. IN THE PAST MONTH, HAVE YOU WISHED YOU WERE DEAD OR WISHED YOU COULD GO TO SLEEP AND NOT WAKE UP?: NO
6. HAVE YOU EVER DONE ANYTHING, STARTED TO DO ANYTHING, OR PREPARED TO DO ANYTHING TO END YOUR LIFE?: NO

## 2025-07-08 ASSESSMENT — PAIN SCALES - GENERAL: PAINLEVEL_OUTOF10: 0-NO PAIN

## 2025-07-08 NOTE — PROGRESS NOTES
Visit Type: In Person  MD reviewed, Authorization obtained to share with partner.    NEW FERTILITY PATIENT VISIT       LaMeri Pepe is a 35 y.o. here with his wife Vale Pepe for a fertility workup.       Prior Labs  Lab Results    Date Done      AMH: No results found for requested labs within last 1825 days. No results found for requested labs within last 1825 days.   TSH: 2.30 (Ref range: 0.40 - 4.50 mIU/L) 2/17/2025   PRL: No results found for requested labs within last 1825 days. No results found for requested labs within last 1825 days.   Testosterone: No results found for requested labs within last 1825 days. No results found for requested labs within last 1825 days.   DHEAS: No results found for requested labs within last 1825 days. No results found for requested labs within last 1825 days.   FSH: No results found for requested labs within last 1825 days. No results found for requested labs within last 1825 days.   17 OHP: No results found for requested labs within last 1825 days. No results found for requested labs within last 1825 days.   HgbA1c: 5.0 (Ref range: <5.7 % of total Hgb) 2/17/2025   Hepatitis B surface antigen: No results found for requested labs within last 1825 days. No results found for requested labs within last 1825 days.   Hepatitis C antibody: No results found for requested labs within last 1825 days. No results found for requested labs within last 1825 days.   HIV ½ Antigen Antibody screen with reflex: No results found for requested labs within last 1825 days. No results found for requested labs within last 1825 days.   Syphilis screening with reflex: No results found for requested labs within last 1825 days. No results found for requested labs within last 1825 days.   GC: No results found for requested labs within last 1825 days. No results found for requested labs within last 1825 days.   CT: No results found for requested labs within last 1825 days. No results found for  "requested labs within last 1825 days.   Type and Screen: No results found for requested labs within last 1825 days. No results found for requested labs within last 1825 days.   Rh: No results found for requested labs within last 1825 days. No results found for requested labs within last 1825 days.   Antibody: No results found for requested labs within last 1825 days. No results found for requested labs within last 1825 days.   Rubella: No results found for requested labs within last 1825 days. No results found for requested labs within last 1825 days.   Varicella: No results found for requested labs within last 1825 days. No results found for requested labs within last 1825 days.   Hemoglobin: No results found for requested labs within last 1825 days. No results found for requested labs within last 1825 days.   Hematocrit: No results found for requested labs within last 1825 days. No results found for requested labs within last 1825 days.   Creatinine: 1.21 (Ref range: 0.60 - 1.26 mg/dL) 2025   AST:29 (Ref range: 10 - 40 U/L) 2025   ALT:26 (Ref range: 9 - 46 U/L): 2025      Relationship Status:           PMH  Medical History[1]     MEDICATIONS  Medications Ordered Prior to Encounter[2]    PSH  Surgical History[3]          PARTNER HISTORY    PARTNER HISTORY  Partner Name: Christopher Pepe    Partner : [id number]    Partner email:    Occupation:  medical dosimitrist  Prior fertility history:  3 children with Vale  PMH:  None  PSH:   ACL, vasectomy , Vasectomy reversal in     Smoking:No    Alcohol Use: No    Drug Use: No    Medications:    Injuries: No    STD: No    Please select all that are applicable:    SA: Yes    SA Results: Yes  Right after the reversal in 2017- 1 child since       BMI:   BMI Readings from Last 1 Encounters:   25 30.71 kg/m²     VITALS:  /86   Pulse 74   Temp 36.5 °C (97.7 °F) (Temporal)   Resp 16   Ht 1.905 m (6' 3\")   Wt 111 kg (245 lb 11.2 oz)   " SpO2 100%   BMI 30.71 kg/m²   LMP: No LMP for male patient.    ASSESSMENT   35 y.o. here with his wife for a fertility evaluation.        Routine Testing  Fertility Center  STDs Within 1 year   Genetic carrier Waiver/Completed   T&S Within 1 year   AMH Within 1 year   TSH Within 1 year   Rubella/Varicella Within 5 years     BMI Testing  Fertility Center  CBC Within 1 year   CMP Within 1 year   HgbA1c Within 1 year   Mag, Phos, Vit D <18 Within 1 year   MFM > 40  REQ   Wt loss consult > 40 OPT     PLAN  No orders of the defined types were placed in this encounter.          FOLLOW UP   If proceeds with treatment then will obtain and SA and STD's.     Intimate Exam Performed: No, an intimate exam was not performed at this encounter.     Odessa Purcell  07/08/2025  1:52 PM       [1]   Past Medical History:  Diagnosis Date    Hypertriglyceridemia 03/09/2023   [2]   Current Outpatient Medications on File Prior to Visit   Medication Sig Dispense Refill    omega-3 acid ethyl esters (Lovaza) 1 gram capsule Take 1 capsule (1 g) by mouth 2 times a day. For high triglycerides 180 capsule 3    cyclobenzaprine (Flexeril) 10 mg tablet Take 1 tablet (10 mg) by mouth 3 times a day as needed for muscle spasms for up to 10 days. Use caution, may make you drowsy 20 tablet 0    methylPREDNISolone (Medrol Dospak) 4 mg tablets Take as directed on package. (Patient not taking: Reported on 7/8/2025) 21 tablet 0     No current facility-administered medications on file prior to visit.   [3]   Past Surgical History:  Procedure Laterality Date    JOINT REPLACEMENT  12/20/2008

## 2025-07-30 DIAGNOSIS — Z11.3 ENCOUNTER FOR SCREENING EXAMINATION FOR SEXUALLY TRANSMITTED DISEASE: Primary | ICD-10-CM

## 2025-07-30 DIAGNOSIS — Z13.71 SCREENING FOR GENETIC DISEASE CARRIER STATUS: ICD-10-CM

## 2025-07-30 DIAGNOSIS — Z31.41 FERTILITY TESTING: ICD-10-CM

## 2025-08-06 ENCOUNTER — ANCILLARY PROCEDURE (OUTPATIENT)
Dept: ENDOCRINOLOGY | Facility: CLINIC | Age: 35
End: 2025-08-06
Payer: OTHER GOVERNMENT

## 2025-08-06 ENCOUNTER — APPOINTMENT (OUTPATIENT)
Dept: LAB | Facility: HOSPITAL | Age: 35
End: 2025-08-06
Payer: OTHER GOVERNMENT

## 2025-08-06 ENCOUNTER — APPOINTMENT (OUTPATIENT)
Dept: ENDOCRINOLOGY | Facility: CLINIC | Age: 35
End: 2025-08-06
Payer: OTHER GOVERNMENT

## 2025-08-06 DIAGNOSIS — Z31.41 FERTILITY TESTING: ICD-10-CM

## 2025-08-06 LAB
% EX RESIDUAL CYTOPLASM (SEMEN): 0 %
% HEAD DEFECTS (SEMEN): 95.5 %
% NECK MIDPIECE (SEMEN): 9.8 %
% NORMAL (SEMEN): 4.5 % (ref 4–?)
% TAIL DEFECTS (SEMEN): 3.8 %
ABSTINENCE (DAYS): 3 DAYS (ref 2–7)
AGGLUTINATION (SEMEN): NO
ANALYZED TIME:: NORMAL
ANDROLOGY LAB ID#: NORMAL
CLUMPS (SEMEN): NO
COLLECTED COMPLETELY: YES
COLLECTION LOCATION:: NORMAL
COLLECTION METHOD:: NORMAL
CONCENTRATION(SEMEN): 80.8 MILL/ML (ref 15–?)
DEBRIS (SEMEN): YES
LEUKOCYTE (SEMEN): NEGATIVE
NON PROG. MOTILITY (SEMEN): 4 %
PROG. MOTILITY (SEMEN): 50 % (ref 32–?)
RECEIVED TIME:: NORMAL
REI PARTNER DOB: NORMAL
REI PARTNER NAME: NORMAL
SEMEN APPEARANCE: NORMAL
SEMEN LIQUEFACTION: NORMAL
SEMEN VISCOSITY: NORMAL
TOT. NO OF NORM. MOTILE SPERM (SEMEN): 3.72 MILL
TOT. NO OF NORM. SPERM (SEMEN): 6.87 MILL
TOTAL MOTILITY (SEMEN): 54 % (ref 40–?)
TOTAL NO OF MOTILE (SEMEN): 82.76 MILL
TOTAL NO OF RND CELLS (SEMEN): 1.9 MILL (ref ?–5)
TOTAL NO OF SPERM (SEMEN): 152.71 MILL (ref 39–?)
VOLUME (SEMEN): 1.89 ML (ref 1.5–?)

## 2025-08-06 PROCEDURE — 89322 SEMEN ANAL STRICT CRITERIA: CPT | Performed by: OBSTETRICS & GYNECOLOGY

## 2025-08-07 LAB
C TRACH RRNA SPEC QL NAA+PROBE: NOT DETECTED
HBV SURFACE AG SERPL QL IA: NORMAL
HCV AB SERPL QL IA: NORMAL
HIV 1+2 AB+HIV1 P24 AG SERPL QL IA: NORMAL
HIV 1+2 AB+HIV1 P24 AG SERPL QL IA: NORMAL
N GONORRHOEA RRNA SPEC QL NAA+PROBE: NOT DETECTED
QUEST GC CT AMPLIFIED (ALWAYS MESSAGE): NORMAL
T PALLIDUM AB SER QL IA: NORMAL

## 2025-08-10 LAB
C TRACH RRNA SPEC QL NAA+PROBE: NOT DETECTED
HBV SURFACE AG SERPL QL IA: NORMAL
HCV AB SERPL QL IA: NORMAL
HIV 1+2 AB+HIV1 P24 AG SERPL QL IA: NORMAL
HIV 1+2 AB+HIV1 P24 AG SERPL QL IA: NORMAL
N GONORRHOEA RRNA SPEC QL NAA+PROBE: NOT DETECTED
QUEST GC CT AMPLIFIED (ALWAYS MESSAGE): NORMAL
T PALLIDUM AB SER QL IA: NEGATIVE

## 2025-08-27 LAB — COMMENTS - MP RESULT TYPE: NORMAL

## 2025-09-03 ENCOUNTER — APPOINTMENT (OUTPATIENT)
Dept: RADIOLOGY | Facility: HOSPITAL | Age: 35
End: 2025-09-03
Payer: OTHER GOVERNMENT

## 2025-09-03 ENCOUNTER — HOSPITAL ENCOUNTER (EMERGENCY)
Facility: HOSPITAL | Age: 35
Discharge: HOME | End: 2025-09-03
Payer: OTHER GOVERNMENT

## 2025-09-03 VITALS
TEMPERATURE: 97.2 F | HEART RATE: 74 BPM | HEIGHT: 75 IN | RESPIRATION RATE: 16 BRPM | BODY MASS INDEX: 29.22 KG/M2 | OXYGEN SATURATION: 99 % | DIASTOLIC BLOOD PRESSURE: 88 MMHG | WEIGHT: 235 LBS | SYSTOLIC BLOOD PRESSURE: 132 MMHG

## 2025-09-03 DIAGNOSIS — R05.9 COUGH, UNSPECIFIED TYPE: Primary | ICD-10-CM

## 2025-09-03 DIAGNOSIS — Z14.8 GENETIC CARRIER OF HERITABLE CANCER: Primary | ICD-10-CM

## 2025-09-03 LAB
FLUAV RNA RESP QL NAA+PROBE: NOT DETECTED
FLUBV RNA RESP QL NAA+PROBE: NOT DETECTED
RSV RNA RESP QL NAA+PROBE: NOT DETECTED
SARS-COV-2 RNA RESP QL NAA+PROBE: NOT DETECTED

## 2025-09-03 PROCEDURE — 99284 EMERGENCY DEPT VISIT MOD MDM: CPT | Mod: 25

## 2025-09-03 PROCEDURE — 87637 SARSCOV2&INF A&B&RSV AMP PRB: CPT

## 2025-09-03 PROCEDURE — 71046 X-RAY EXAM CHEST 2 VIEWS: CPT | Performed by: RADIOLOGY

## 2025-09-03 PROCEDURE — 71046 X-RAY EXAM CHEST 2 VIEWS: CPT

## 2025-09-03 RX ORDER — PREDNISONE 50 MG/1
50 TABLET ORAL DAILY
Qty: 5 TABLET | Refills: 0 | Status: SHIPPED | OUTPATIENT
Start: 2025-09-03 | End: 2025-09-08

## 2025-09-03 RX ORDER — AZITHROMYCIN 500 MG/1
500 TABLET, FILM COATED ORAL DAILY
Qty: 5 TABLET | Refills: 0 | Status: SHIPPED | OUTPATIENT
Start: 2025-09-03 | End: 2025-09-08

## 2025-09-03 RX ORDER — BENZONATATE 100 MG/1
100 CAPSULE ORAL EVERY 8 HOURS
Qty: 21 CAPSULE | Refills: 0 | Status: SHIPPED | OUTPATIENT
Start: 2025-09-03 | End: 2025-09-10

## 2025-09-03 ASSESSMENT — PAIN - FUNCTIONAL ASSESSMENT: PAIN_FUNCTIONAL_ASSESSMENT: 0-10

## 2025-09-03 ASSESSMENT — PAIN SCALES - GENERAL: PAINLEVEL_OUTOF10: 0 - NO PAIN

## 2026-02-05 ENCOUNTER — APPOINTMENT (OUTPATIENT)
Dept: PRIMARY CARE | Facility: CLINIC | Age: 36
End: 2026-02-05
Payer: OTHER GOVERNMENT